# Patient Record
Sex: MALE | Race: WHITE | NOT HISPANIC OR LATINO | Employment: FULL TIME | URBAN - METROPOLITAN AREA
[De-identification: names, ages, dates, MRNs, and addresses within clinical notes are randomized per-mention and may not be internally consistent; named-entity substitution may affect disease eponyms.]

---

## 2017-11-07 ENCOUNTER — OFFICE VISIT - RIVER FALLS (OUTPATIENT)
Dept: FAMILY MEDICINE | Facility: CLINIC | Age: 22
End: 2017-11-07

## 2017-11-07 ASSESSMENT — MIFFLIN-ST. JEOR: SCORE: 2006.01

## 2018-10-18 ENCOUNTER — OFFICE VISIT - RIVER FALLS (OUTPATIENT)
Dept: FAMILY MEDICINE | Facility: CLINIC | Age: 23
End: 2018-10-18

## 2018-10-18 ASSESSMENT — MIFFLIN-ST. JEOR: SCORE: 2100.47

## 2018-10-19 LAB
CHOLEST SERPL-MCNC: 146 MG/DL
CHOLEST/HDLC SERPL: 2.7 {RATIO}
GLUCOSE BLD-MCNC: 88 MG/DL (ref 65–99)
HDLC SERPL-MCNC: 54 MG/DL
LDLC SERPL CALC-MCNC: 78 MG/DL
NONHDLC SERPL-MCNC: 92 MG/DL
TRIGL SERPL-MCNC: 67 MG/DL

## 2019-10-11 ENCOUNTER — OFFICE VISIT - RIVER FALLS (OUTPATIENT)
Dept: FAMILY MEDICINE | Facility: CLINIC | Age: 24
End: 2019-10-11

## 2019-10-11 ASSESSMENT — MIFFLIN-ST. JEOR: SCORE: 2138.12

## 2019-11-14 ENCOUNTER — OFFICE VISIT - RIVER FALLS (OUTPATIENT)
Dept: FAMILY MEDICINE | Facility: CLINIC | Age: 24
End: 2019-11-14

## 2019-11-14 ASSESSMENT — MIFFLIN-ST. JEOR: SCORE: 2158.08

## 2019-11-15 ENCOUNTER — COMMUNICATION - RIVER FALLS (OUTPATIENT)
Dept: FAMILY MEDICINE | Facility: CLINIC | Age: 24
End: 2019-11-15

## 2019-11-15 LAB
CHLAMYDIA TRACHOMATIS RNA, TMA - QUEST: NOT DETECTED
CHOLEST SERPL-MCNC: 158 MG/DL
CHOLEST/HDLC SERPL: 3.6 {RATIO}
HDLC SERPL-MCNC: 44 MG/DL
HIV AG/AB  - NOTE: NORMAL
LDLC SERPL CALC-MCNC: 100 MG/DL
NEISSERIA GONORRHOEAE RNA TMA: NOT DETECTED
NONHDLC SERPL-MCNC: 114 MG/DL
RPR: NORMAL
TRIGL SERPL-MCNC: 49 MG/DL

## 2020-05-28 ENCOUNTER — AMBULATORY - RIVER FALLS (OUTPATIENT)
Dept: FAMILY MEDICINE | Facility: CLINIC | Age: 25
End: 2020-05-28

## 2020-10-05 ENCOUNTER — OFFICE VISIT - RIVER FALLS (OUTPATIENT)
Dept: FAMILY MEDICINE | Facility: CLINIC | Age: 25
End: 2020-10-05

## 2020-10-05 ASSESSMENT — MIFFLIN-ST. JEOR: SCORE: 2209.57

## 2021-11-11 ENCOUNTER — OFFICE VISIT - RIVER FALLS (OUTPATIENT)
Dept: FAMILY MEDICINE | Facility: CLINIC | Age: 26
End: 2021-11-11

## 2021-11-11 LAB — GLUCOSE BLD-MCNC: 99 MG/DL (ref 65–99)

## 2021-11-11 ASSESSMENT — MIFFLIN-ST. JEOR: SCORE: 2320.24

## 2021-11-12 ENCOUNTER — COMMUNICATION - RIVER FALLS (OUTPATIENT)
Dept: FAMILY MEDICINE | Facility: CLINIC | Age: 26
End: 2021-11-12

## 2022-02-11 VITALS
HEIGHT: 74 IN | OXYGEN SATURATION: 95 % | SYSTOLIC BLOOD PRESSURE: 115 MMHG | HEART RATE: 69 BPM | TEMPERATURE: 97.1 F | BODY MASS INDEX: 30.38 KG/M2 | DIASTOLIC BLOOD PRESSURE: 69 MMHG | WEIGHT: 236.7 LBS

## 2022-02-11 VITALS
HEIGHT: 74 IN | BODY MASS INDEX: 32.01 KG/M2 | SYSTOLIC BLOOD PRESSURE: 120 MMHG | DIASTOLIC BLOOD PRESSURE: 70 MMHG | WEIGHT: 245 LBS | HEART RATE: 64 BPM | WEIGHT: 249.4 LBS | OXYGEN SATURATION: 96 % | BODY MASS INDEX: 31.44 KG/M2 | HEART RATE: 72 BPM | DIASTOLIC BLOOD PRESSURE: 72 MMHG | HEIGHT: 74 IN | SYSTOLIC BLOOD PRESSURE: 122 MMHG | TEMPERATURE: 98 F

## 2022-02-11 VITALS
HEART RATE: 63 BPM | HEIGHT: 74 IN | BODY MASS INDEX: 33.24 KG/M2 | TEMPERATURE: 97.5 F | DIASTOLIC BLOOD PRESSURE: 80 MMHG | WEIGHT: 259 LBS | SYSTOLIC BLOOD PRESSURE: 123 MMHG

## 2022-02-11 VITALS
WEIGHT: 283.4 LBS | HEIGHT: 74 IN | BODY MASS INDEX: 36.37 KG/M2 | SYSTOLIC BLOOD PRESSURE: 135 MMHG | DIASTOLIC BLOOD PRESSURE: 86 MMHG | HEART RATE: 79 BPM

## 2022-02-11 VITALS
TEMPERATURE: 98.1 F | BODY MASS INDEX: 27.59 KG/M2 | SYSTOLIC BLOOD PRESSURE: 112 MMHG | WEIGHT: 215 LBS | DIASTOLIC BLOOD PRESSURE: 68 MMHG | HEART RATE: 64 BPM | HEIGHT: 74 IN

## 2022-02-16 NOTE — TELEPHONE ENCOUNTER
---------------------  From: José Miguel Mendez PA-C   To: ERICK WILLS    Sent: 11/15/2019 8:44:07 AM CST  Subject: Patient Message - Results Notification          These are fine. I will contact you when the rest of your labs are back.  Results:  Date Result Name Ind Value Ref Range   11/14/2019 9:29 AM Cholesterol  158 mg/dL ( - <200)   11/14/2019 9:29 AM Non-HDL Cholesterol  114 ( - <130)   11/14/2019 9:29 AM HDL  44 mg/dL (>40 - )   11/14/2019 9:29 AM Cholesterol/HDL Ratio  3.6 ( - <5.0)   11/14/2019 9:29 AM LDL ((H)) 100    11/14/2019 9:29 AM Triglyceride  49 mg/dL ( - <150)

## 2022-02-16 NOTE — NURSING NOTE
Depression Screening Entered On:  10/11/2019 11:31 AM CDT    Performed On:  10/11/2019 11:30 AM CDT by Sarah Christian MA               Depression Screening   Little Interest - Pleasure in Activities :   Not at all   Feeling Down, Depressed, Hopeless :   Not at all   Initial Depression Screen Score :   0    Trouble Falling or Staying Asleep :   Not at all   Feeling Tired or Little Energy :   Not at all   Poor Appetite or Overeating :   Not at all   Feeling Bad About Yourself :   Not at all   Trouble Concentrating :   Not at all   Moving or Speaking Slowly :   Not at all   Thoughts Better Off Dead or Hurting Self :   Not at all   Detailed Depression Screen Score :   0    Total Depression Screen Score :   0    KEO Difficulty with Work, Home, Others :   Not difficult at all   Sarah Christian MA - 10/11/2019 11:30 AM CDT

## 2022-02-16 NOTE — PROGRESS NOTES
Patient:   ERICK WILLS            MRN: [id number]            FIN: [id number]               Age:   25 years     Sex:  Male     :  1995   Associated Diagnoses:   Annual physical exam   Author:   José Miguel Mendez PA-C   Diagnosis  Annual physical exam (JID57-CS Z00.00).     Visit Information      Date of Service: 10/05/2020 04:40 pm  Performing Location: [facility]  Encounter#: [id number]      Primary Care Provider (PCP):  José Miguel Mendez PA-C    NPI# [id number]      Referring Provider:  Casey Yousif MD    NPI# [id number]   Visit type:  Annual exam.    Accompanied by:  No one.    Source of history:  Medical record.    Referral source:  Self.    History limitation:  None.       Chief Complaint   10/5/2020 4:44 PM CDT    Physical and flu shot      Well Adult History   Well Adult History             The patient presents for well adult exam.  The patient's general health status is described as good.  The patient's diet is described as balanced.  Exercise: routine.  Additional pertinent history: no caffeine use, tobacco use none and alcohol use socially.        Review of Systems   Constitutional:  Negative.    Eye:  Negative.    Ear/Nose/Mouth/Throat:  Negative.    Respiratory:  Negative.    Cardiovascular:  Negative.    Gastrointestinal:  Negative.    Genitourinary:  Negative.    Hematology/Lymphatics:  Negative.    Endocrine:  Negative.    Immunologic:  Negative.    Musculoskeletal:  Negative.    Integumentary:  Negative except as documented in history of present illness.    Neurologic:  Negative.    Psychiatric:  Negative.    All other systems reviewed and negative      Health Status   Allergies:    Allergic Reactions (Selected)  No Known Medication Allergies   Medications:  (Selected)   Documented Medications  Documented  Benedryl Allergy Cold: PRN: Bee sting, 0 Refill(s)   Problem list:    All Problems (Selected)  Obesity / SNOMED CT 7358225660 / Probable      Histories   Past Medical  History:    Resolved  Obesity (278.00):  Resolved.   Family History:    Hypertension  Grandmother (M)  Grandmother (P)  Leukemia  Grandfather (P)  CA - Cancer of prostate  Grandfather (M)     Procedure history:    Appendectomy (748411375) on 6/9/2001 at 6 Years.   Social History:        Alcohol Assessment: Low Risk                     Comments:                      11/07/2017 - Maral Armenta MD                     drinks a beer once or twice a month at the most      Tobacco Assessment: Denies Tobacco Use            Never      Substance Abuse Assessment: Denies Substance Abuse      Employment and Education Assessment            Employed, Work/School description: .      Exercise and Physical Activity Assessment: Regular exercise            50 Exercise duration:.  5-6 times/week Exercise frequency:.  Good condition Self assessment:.  Weight lifting               Exercise type:.                     Comments:                      11/07/2017 - Maral Armenta MD                     both cardio and weight lifting      Sexual Assessment            Sexually active: No.        Physical Examination   Vital Signs   10/5/2020 4:44 PM CDT Temperature Tympanic 97.5 DegF  LOW    Peripheral Pulse Rate 63 bpm    HR Method Electronic    Systolic Blood Pressure 123 mmHg    Diastolic Blood Pressure 80 mmHg    Mean Arterial Pressure 94 mmHg    BP Site Right arm    BP Method Electronic      Measurements from flowsheet : Measurements   10/5/2020 4:44 PM CDT Height Measured - Standard 74 in    Weight Measured - Standard 259 lb    BSA 2.47 m2    Body Mass Index 33.25 kg/m2  HI      General:  No acute distress.    Eye:  Pupils are equal, round and reactive to light, Extraocular movements are intact, Normal conjunctiva.    HENT:  Normocephalic, Tympanic membranes are clear, Oral mucosa is moist, No pharyngeal erythema.    Neck:  Supple, Non-tender, No lymphadenopathy, No thyromegaly.    Respiratory:  Lungs are clear to  auscultation, Respirations are non-labored, Breath sounds are equal.    Cardiovascular:  Normal rate, Regular rhythm, No murmur.    Gastrointestinal:  Soft, Non-tender, Non-distended, Normal bowel sounds, No organomegaly.    Genitourinary:  No costovertebral angle tenderness.    Musculoskeletal:  Normal gait.    Integumentary:  No rash.    Neurologic:  No focal deficits.    Psychiatric:  Cooperative, Appropriate mood & affect.       Health Maintenance      Recommendations     Pending (in the next year)        OverDue           Tetanus Vaccine due  04/15/20  and every 10  year(s)           Influenza Vaccine due  08/31/20  and every 1  year(s)        Due            STD Counseling (if sexually active) (Male) due  10/05/20  and every 1  year(s)        Near Due            Alcohol Misuse Screen (Male) near due  10/11/20  and every 1  year(s)           HIV Screen (if sexually active) (Male) near due  11/14/20  and every 1  year(s)           Syphilis Screen (if sexually active) (Male) near due  11/14/20  and every 1  year(s)     Satisfied (in the past 1 year)        Satisfied            Alcohol Misuse Screen (Male) on  10/11/19.           Body Mass Index Check (Male) on  10/05/20.           Body Mass Index Check (Male) on  11/14/19.           Body Mass Index Check (Male) on  10/11/19.           Depression Screen (Male) on  10/05/20.           Depression Screen (Male) on  10/11/19.           Depression Screen (Male) on  10/11/19.           Depression Screen (Male) on  10/11/19.           HIV Screen (if sexually active) (Male) on  11/14/19.           High Blood Pressure Screen (Male) on  10/05/20.           High Blood Pressure Screen (Male) on  11/14/19.           High Blood Pressure Screen (Male) on  10/11/19.           Influenza Vaccine on  10/11/19.           Obesity Screen and Counseling (Male) on  10/05/20.           Obesity Screen and Counseling (Male) on  11/14/19.           Obesity Screen and Counseling (Male) on   10/11/19.           Syphilis Screen (if sexually active) (Male) on  11/14/19.           Tobacco Use Screen (Male) on  10/05/20.           Tobacco Use Screen (Male) on  11/14/19.           Tobacco Use Screen (Male) on  10/11/19.             Impression and Plan   Diagnosis     Annual physical exam (UZK13-KU Z00.00).     Plan:  RTC in one year and PRN.

## 2022-02-16 NOTE — LETTER
(Inserted Image. Unable to display)   October 19, 2021    ERICK WILLS  [address] [place] [address]            Dear ERICK,      Thank you for selecting [facility] for your healthcare needs.    Our records indicate you are due for the following services:     Fasting Lab Tests ~ Please do not eat or drink anything 10 hours prior to your scheduled appointment time.  (Water and any medications that you may need are allowed unless directed otherwise.)    If you had your labs done at another facility or with [facility] at On license of [facility], please bring in a copy of the results to your next visit, mail a copy, or drop off a copy of your results to your Healthcare Provider.    (FYI   Regarding office visits: In some instances, a video visit or telephone visit may be offered as an option.)      To schedule an appointment or if you have further questions, please contact your clinic at [phone].      Powered by [employer]    Sincerely,    José Miguel Mendez PA-C

## 2022-02-16 NOTE — NURSING NOTE
Comprehensive Intake Entered On:  10/11/2019 11:15 AM CDT    Performed On:  10/11/2019 11:11 AM CDT by Sarah Christian MA               Summary   Chief Complaint :   Annual Exam    Menstrual Status :   N/A   Weight Measured :   245 lb(Converted to: 245 lb 0 oz, 111.13 kg)    Height Measured :   73.5 in(Converted to: 6 ft 1 in, 186.69 cm)    Body Mass Index :   31.88 kg/m2 (HI)    Body Surface Area :   2.4 m2   Systolic Blood Pressure :   122 mmHg   Diastolic Blood Pressure :   70 mmHg   Mean Arterial Pressure :   87 mmHg   Peripheral Pulse Rate :   64 bpm   BP Site :   Right arm   Pulse Site :   Radial artery   BP Method :   Manual   HR Method :   Manual   Temperature Tympanic :   98.0 DegF(Converted to: 36.7 DegC)    Sarah Christian MA - 10/11/2019 11:11 AM CDT   Health Status   Allergies Verified? :   Yes   Medication History Verified? :   Yes   Immunizations Current :   Yes   Medical History Verified? :   Yes   Pre-Visit Planning Status :   Completed   Tobacco Use? :   Never smoker   Sarah Christian MA - 10/11/2019 11:11 AM CDT   Consents   Consent for Immunization Exchange :   Consent Granted   Consent for Immunizations to Providers :   Consent Granted   Sarah Christian MA - 10/11/2019 11:11 AM CDT   Meds / Allergies   (As Of: 10/11/2019 11:15:19 AM CDT)   Allergies (Active)   No Known Medication Allergies  Estimated Onset Date:   Unspecified ; Created By:   Sarah Christian MA; Reaction Status:   Active ; Category:   Drug ; Substance:   No Known Medication Allergies ; Type:   Allergy ; Updated By:   Sarah Christian MA; Reviewed Date:   10/11/2019 11:13 AM CDT        Medication List   (As Of: 10/11/2019 11:15:19 AM CDT)   Home Meds    APAP/diphenhydramine/pseudoephedrine  :   APAP/diphenhydramine/pseudoephedrine ; Status:   Documented ; Ordered As Mnemonic:   Benedryl Allergy Cold ; Simple Display Line:   PRN: Bee sting ; Catalog Code:   acetaminophen/diphenhydrAMINE/pseudoephe ; Order Dt/Tm:   8/13/2010 3:35:31  PM CDT

## 2022-02-16 NOTE — PROGRESS NOTES
Patient:   ERICK WILLS            MRN: [id number]            FIN: [id number]               Age:   24 years     Sex:  Male     :  1995   Associated Diagnoses:   Screening cholesterol level; Screening for STD (sexually transmitted disease); Obesity   Author:   José Miguel Mendez PA-C      Report Summary       1. Assessment and Plan:            Diagnosis: Screening cholesterol level (RCS12-OA Z13.220).       Chief Complaint   2019 8:55 AM CST   STD testing, fasting for bloodwork        Subjective   Chief complaint 2019 8:55 AM CST   STD testing, fasting for bloodwork  .     No symptoms.       Health Status   Allergies:    Allergic Reactions (All)  No Known Medication Allergies  Canceled/Inactive Reactions (All)  No known allergies   Medications:  (Selected)   Documented Medications  Documented  Benedryl Allergy Cold: PRN: Bee sting, 0 Refill(s),    Medications          *denotes recorded medication          *Benedryl Allergy Cold: PRN: Bee sting.       Problem list:    All Problems (Selected)  Obesity / SNOMED CT 8538105446 / Probable      Objective   Vital Signs   2019 8:55 AM CST Peripheral Pulse Rate 72 bpm    HR Method Electronic    Systolic Blood Pressure 120 mmHg    Diastolic Blood Pressure 72 mmHg    Mean Arterial Pressure 88 mmHg    BP Site Right arm    BP Method Manual    Oxygen Saturation 96 %      Measurements from flowsheet : Measurements   2019 8:55 AM CST Height Measured - Standard 73.5 in    Weight Measured - Standard 249.4 lb    BSA 2.42 m2    Body Mass Index 32.45 kg/m2  HI      General:  No acute distress.    Respiratory:  Respirations are non-labored.    Cardiovascular:  Normal rate.       Impression and Plan   Assessment and Plan:          Diagnosis: Screening cholesterol level (URE09-TB Z13.220), Screening for STD (sexually transmitted disease) (VXY61-YN Z11.3), Obesity (NTF07-XT E66.9).    Orders     Orders (Selected)   Outpatient Orders  Ordered  (Dispatched)  Chlamydia/Neisseria gonorrhoeae RNA, TMA* ([facility]): Specimen Type: Urine, Collection Date: 11/14/19 9:06:00 CST  HIV-1/2 Antigen and Antibodies, Fourth Generation, with Reflexes* ([facility]): Specimen Type: Serum, Collection Date: 11/14/19 9:06:00 CST  Lipid panel with reflex to direct ldl* ([facility]): Specimen Type: Serum, Collection Date: 11/14/19 9:08:00 CST  RPR (dx) w/refl titer and confirmatory testing* ([facility]): Specimen Type: Serum, Collection Date: 11/14/19 9:06:00 CST.     .    FU with labs

## 2022-02-16 NOTE — TELEPHONE ENCOUNTER
A summary of the submitted changes has been added to the patient's chart. You may review the document by clicking on the message attachment located above.Submission Details -Originating Source: PatientOriginating Author: ERICK Phippsbmission Date: 10/16/2018 at 06:20 PMAppointment Details - Appointment Date: 10/18/2018 at 06:00 AM Appointment Type: PHYSICAL - VHAppointment Resource: José Miguel Mendez PA-CAppointshira Location: [facility]

## 2022-02-16 NOTE — LETTER
(Inserted Image. Unable to display)   October 06, 2021    ERICK WILLS  [address] [place] [address]            Dear ERICK,      Thank you for selecting [facility] for your healthcare needs.    Our records indicate you are due for the following services:     Annual Physical.    (FYI   Regarding office visits: In some instances, a video visit or telephone visit may be offered as an option.)      To schedule an appointment or if you have further questions, please contact your clinic at [phone].      Powered by [employer]    Sincerely,    José Miguel Mendez PA-C

## 2022-02-16 NOTE — TELEPHONE ENCOUNTER
---------------------  From: José Miguel Mendez PA-C   To: ERICK WILLS    Sent: 11/12/2021 6:12:09 AM CST  Subject: General Message       This is high normal. We should repeat that in one year. Continue to stay active. Eat well balanced meals.    Results:  Date Result Name Value Ref Range   11/11/2021 9:11 AM Glucose Level 99 mg/dL (65 - 99)

## 2022-02-16 NOTE — NURSING NOTE
Comprehensive Intake Entered On:  10/5/2020 4:46 PM CDT    Performed On:  10/5/2020 4:44 PM CDT by Usha Montesinos CMA               Summary   Chief Complaint :   Physical and flu shot   Menstrual Status :   N/A   Weight Measured :   259 lb(Converted to: 259 lb 0 oz, 117.480 kg)    Height Measured :   74 in(Converted to: 6 ft 2 in, 187.96 cm)    Body Mass Index :   33.25 kg/m2 (HI)    Body Surface Area :   2.47 m2   Systolic Blood Pressure :   123 mmHg   Diastolic Blood Pressure :   80 mmHg   Mean Arterial Pressure :   94 mmHg   Peripheral Pulse Rate :   63 bpm   BP Site :   Right arm   BP Method :   Electronic   HR Method :   Electronic   Temperature Tympanic :   97.5 DegF(Converted to: 36.4 DegC)  (LOW)    Usha Montesinos CMA - 10/5/2020 4:44 PM CDT   Health Status   Allergies Verified? :   Yes   Medication History Verified? :   Yes   Immunizations Current :   Yes   Medical History Verified? :   Yes   Pre-Visit Planning Status :   Completed   Tobacco Use? :   Never smoker   Usha Montesinos CMA - 10/5/2020 4:44 PM CDT   Consents   Consent for Immunization Exchange :   Consent Granted   Consent for Immunizations to Providers :   Consent Granted   Usha Montesinos CMA - 10/5/2020 4:44 PM CDT   Meds / Allergies   (As Of: 10/5/2020 4:46:59 PM CDT)   Allergies (Active)   No Known Medication Allergies  Estimated Onset Date:   Unspecified ; Created By:   Sarah Christian MA; Reaction Status:   Active ; Category:   Drug ; Substance:   No Known Medication Allergies ; Type:   Allergy ; Updated By:   Sarah Christian MA; Reviewed Date:   10/5/2020 4:45 PM CDT        Medication List   (As Of: 10/5/2020 4:46:59 PM CDT)   Home Meds    APAP/diphenhydramine/pseudoephedrine  :   APAP/diphenhydramine/pseudoephedrine ; Status:   Documented ; Ordered As Mnemonic:   Benedryl Allergy Cold ; Simple Display Line:   PRN: Bee sting ; Catalog Code:   acetaminophen/diphenhydrAMINE/pseudoephe ; Order Dt/Tm:   8/13/2010 3:35:31 PM CDT            ID Risk  Screen   Recent Travel History :   No recent travel   Family Member Travel History :   No recent travel   Other Exposure to Infectious Disease :   Unknown   Usha Montesinos CMA - 10/5/2020 4:44 PM CDT

## 2022-02-16 NOTE — PROGRESS NOTES
Patient:   ERICK WILLS            MRN: [id number]            FIN: [id number]               Age:   23 years     Sex:  Male     :  1995   Associated Diagnoses:   Annual physical exam; Lipid screening; Encounter for screening examination for impaired glucose regulation and diabetes mellitus   Author:   José Miguel Mendez PA-C      Visit Information      Date of Service: 10/18/2018 08:00 am  Performing Location: [facility]  Encounter#: [id number]      Primary Care Provider (PCP):  José Miguel Mendez PA-C    NPI# [id number]      Referring Provider:  José Miguel Mendez PA-C# [id number]   Visit type:  Annual exam.    Accompanied by:  No one.    Source of history:  Medical record.    Referral source:  Self.    History limitation:  None.       Chief Complaint   10/18/2018 8:14 AM CDT   Patient is here for annual px and has no concerns, Flu shot  (Modified)       Well Adult History   Well Adult History             The patient presents for well adult exam.  The patient's general health status is described as good.  The patient's diet is described as balanced.  Exercise: routine.  Complaint: None.  Additional pertinent history: no caffeine use, tobacco use none and no alcohol use.        Review of Systems   Constitutional:  Negative.    Eye:  Negative.    Ear/Nose/Mouth/Throat:  Negative.    Respiratory:  Negative.    Cardiovascular:  Negative.    Gastrointestinal:  Negative.    Genitourinary:  Negative.    Hematology/Lymphatics:  Negative.    Endocrine:  Negative.    Immunologic:  Negative.    Musculoskeletal:  Negative.    Integumentary:  Negative except as documented in history of present illness.    Neurologic:  Negative.    Psychiatric:  Negative.    All other systems reviewed and negative      Health Status   Allergies:    Allergic Reactions (All)  No known allergies   Medications:  (Selected)   Documented Medications  Documented  Benedryl Allergy Cold: PRN: Bee sting, 0 Refill(s)   Problem list:    All  Problems  Obesity / SNOMED CT 0739828239 / Probable  Resolved: Obesity / ICD-9-.00  Canceled: No Chronic Problems / Cerner NKP      Histories   Past Medical History:    Resolved  Obesity (278.00):  Resolved.   Family History:    Hypertension  Grandmother (M)  Grandmother (P)  Leukemia  Grandfather (P)  CA - Cancer of prostate  Grandfather (M)     Procedure history:    Appendectomy (255137673) on 6/9/2001 at 6 Years.   Social History:        Alcohol Assessment: Low Risk                     Comments:                      11/07/2017 - Maral Armenta MD                     drinks a beer once or twice a month at the most      Tobacco Assessment: Denies Tobacco Use            Never      Substance Abuse Assessment: Denies Substance Abuse      Employment and Education Assessment            Employed, Work/School description: .      Exercise and Physical Activity Assessment: Regular exercise                     Comments:                      11/07/2017 - Maral Armenta MD                     both cardio and weight lifting      Sexual Assessment            Sexually active: No.        Physical Examination   Vital Signs   10/18/2018 8:14 AM CDT Temperature Tympanic 97.1 DegF  LOW    Peripheral Pulse Rate 69 bpm    Pulse Site Radial artery    HR Method Electronic    Systolic Blood Pressure 115 mmHg    Diastolic Blood Pressure 69 mmHg    Mean Arterial Pressure 84 mmHg    BP Site Left arm    BP Method Electronic    Oxygen Saturation 95 %      Measurements from flowsheet : Measurements   10/18/2018 8:14 AM CDT Height Measured - Standard 73.5 in    Weight Measured - Standard 236.7 lb    BSA 2.36 m2    Body Mass Index 30.8 kg/m2  HI      General:  No acute distress.    Eye:  Pupils are equal, round and reactive to light, Extraocular movements are intact, Normal conjunctiva.    HENT:  Normocephalic, Tympanic membranes are clear, Oral mucosa is moist, No pharyngeal erythema.    Neck:  Supple, Non-tender, No  lymphadenopathy, No thyromegaly.    Respiratory:  Lungs are clear to auscultation, Respirations are non-labored, Breath sounds are equal.    Cardiovascular:  Normal rate, Regular rhythm, No murmur.    Gastrointestinal:  Soft, Non-tender, Non-distended, Normal bowel sounds, No organomegaly.    Genitourinary:  No costovertebral angle tenderness.    Musculoskeletal:  Pain at left TMJ region with some clicking. .    Integumentary:  No rash, 2mm skin tag left upper eyelid. 4mm Pigmented nevi on right cheek, near corner of mouth..    Neurologic:  No focal deficits.    Psychiatric:  Cooperative, Appropriate mood & affect.       Health Maintenance      Recommendations     Pending (in the next year)        Due            HIV Screen (if sexually active) (Male) due  10/18/18  and every 1  year(s)           STD Counseling (if sexually active) (Male) due  10/18/18  and every 1  year(s)           Syphilis Screen (if sexually active) (Male) due  10/18/18  and every 1  year(s)        Near Due            Body Mass Index Check (Male) near due  11/07/18  and every 1  year(s)           High Blood Pressure Screen (Male) near due  11/07/18  and every 1  year(s)           Tobacco Use Screen (Male) near due  11/07/18  and every 1  year(s)           Alcohol Misuse Screen (Male) near due  11/07/18  and every 1  year(s)           Depression Screen (Male) near due  11/07/18  and every 1  year(s)     Satisfied (in the past 1 year)        Satisfied            Alcohol Misuse Screen (Male) on  11/07/17.           Body Mass Index Check (Male) on  11/07/17.           Depression Screen (Male) on  11/07/17.           Depression Screen (Male) on  11/07/17.           Depression Screen (Male) on  11/07/17.           High Blood Pressure Screen (Male) on  11/07/17.           Tobacco Use Screen (Male) on  11/07/17.          Procedure   Dermatology Surgical Procedure   Date/ Time:  11/25/2015 2:18:00 PM.     Confirmed: patient, procedure, side, and site are  correct.     Informed consent: signed by patient.     Indication: remove lesion.     Procedure performed: shave biopsy.     Shave biopsy: site: right cheek, 15  blade, sterile preparation of site with 70 % alcohol, Anesthesia (1% xylocaine, without epinephrine), lesion size and depth diameter  4 mm, 1  lesions biopsied, specimen obtained and placed in preservative, specimen obtained and sent to pathology, no repair necessary, Skin tag sniped at base. Not sent in. No repair needed..     Complications: none.     Procedure tolerated: well.        Impression and Plan   Diagnosis     Annual physical exam (LAO76-BE Z00.00).     Lipid screening (OGM44-BQ Z13.220).     Encounter for screening examination for impaired glucose regulation and diabetes mellitus (NCX72-VM Z13.1).     Patient Instructions:       Counseled: Patient.    RTC in one year.

## 2022-02-16 NOTE — PROGRESS NOTES
Patient:   ERICK WILLS            MRN: [id number]            FIN: [id number]               Age:   22 years     Sex:  Male     :  1995   Associated Diagnoses:   Well adult exam   Author:   Maral Armenta MD      Chief Complaint   2017 6:20 PM CST    Physical        Well Adult History   Well Adult History             The patient presents for well adult exam.  The patient's general health status is described as good.  The patient's diet is described as balanced and has been working at eating healthy, losing weight.  Exercise: routine.  Associated symptoms consist of none.  Additional pertinent history: Getting  next year. Works as  in [place]. Lives in [place]..        Health Status   Allergies:    Allergic Reactions (All)  No known allergies   Medications:  (Selected)   Outpatient Medications  Ordered  influenza virus vaccine, inactivated preservative-free quadrivalent intramuscular suspension: 0.5 mL, im, once  Documented Medications  Documented  Benedryl Allergy Cold: PRN: Bee sting, 0 Refill(s)   Problem list:    All Problems  No Chronic Problems / Cerner NKP  Resolved: Obesity / ICD-9-.00      Histories   Past Medical History:    Resolved  Obesity (ICD-9-.00):  Resolved.   Family History:    Grandmother (M)  Hypertension  Grandmother (P)  Hypertension  Grandfather (M)  CA - Cancer of prostate  Grandfather (P)  Leukemia     Procedure history:    Appendectomy ([id number]) on 2001 at 6 Years.   Social History:        Alcohol Assessment: Low Risk                     Comments:                      2017 - Maral Armenta MD                     drinks a beer once or twice a month at the most      Tobacco Assessment: Denies Tobacco Use            Never      Substance Abuse Assessment: Denies Substance Abuse      Exercise and Physical Activity Assessment: Regular exercise                     Comments:                      2017 - Maral Armenta MD                      both cardio and weight lifting        Physical Examination   Vital Signs   11/7/2017 6:20 PM CST Temperature Tympanic 98.1 DegF    Peripheral Pulse Rate 64 bpm    Pulse Site Radial artery    HR Method Manual    Systolic Blood Pressure 112 mmHg    Diastolic Blood Pressure 68 mmHg    Mean Arterial Pressure 83 mmHg    BP Site Right arm    BP Method Manual      Measurements from flowsheet : Measurements   11/7/2017 6:20 PM CST Height Measured - Standard 73.75 in    Weight Measured - Standard 215 lb    BSA 2.25 m2    Body Mass Index 27.79 kg/m2         Health Maintenance      Recommendations     Pending (in the next year)        OverDue           Depression Screen (Male) due  11/25/16  and every 1  year(s)        Due            Alcohol Misuse Screen (Male) due  11/07/17  and every 1  year(s)           HIV Screen (if sexually active) (Male) due  11/07/17  and every 1  year(s)           STD Counseling (if sexually active) (Male) due  11/07/17  and every 1  year(s)           Syphilis Screen (if sexually active) (Male) due  11/07/17  and every 1  year(s)     Satisfied (in the past 1 year)        Satisfied            Body Mass Index Check (Male) on  11/07/17.           High Blood Pressure Screen (Male) on  11/07/17.           Obesity Screen and Counseling (Male) on  11/07/17.           Tobacco Use Screen (Male) on  11/07/17.        Impression and Plan   Diagnosis     Well adult exam (TLA58-ID Z00.00).     Course:  Progressing as expected.    Patient Instructions:       Counseled: Patient, BMI, diet, exercise, preventive services..

## 2022-02-16 NOTE — NURSING NOTE
Comprehensive Intake Entered On:  11/11/2021 9:03 AM CST    Performed On:  11/11/2021 9:00 AM CST by Usha Montesinos CMA               Summary   Chief Complaint :   Physical   Menstrual Status :   N/A   Weight Measured :   283.4 lb(Converted to: 283 lb 6 oz, 128.548 kg)    Height Measured :   74 in(Converted to: 6 ft 2 in, 187.96 cm)    Body Mass Index :   36.38 kg/m2 (HI)    Body Surface Area :   2.59 m2   Systolic Blood Pressure :   135 mmHg (HI)    Diastolic Blood Pressure :   86 mmHg (HI)    Mean Arterial Pressure :   102 mmHg   Peripheral Pulse Rate :   79 bpm   BP Site :   Right arm   BP Method :   Electronic   HR Method :   Electronic   Usha Montesinos CMA - 11/11/2021 9:00 AM CST   Health Status   Allergies Verified? :   Yes   Medication History Verified? :   Yes   Immunizations Current :   Yes   Medical History Verified? :   Yes   Usha Montesinos CMA - 11/11/2021 9:00 AM CST   Consents   Consent for Immunization Exchange :   Consent Granted   Consent for Immunizations to Providers :   Consent Granted   Usha Montesinos CMA - 11/11/2021 9:00 AM CST   Meds / Allergies   (As Of: 11/11/2021 9:03:42 AM CST)   Allergies (Active)   No Known Medication Allergies  Estimated Onset Date:   Unspecified ; Created By:   Sarah Christian MA; Reaction Status:   Active ; Category:   Drug ; Substance:   No Known Medication Allergies ; Type:   Allergy ; Updated By:   Sarah Christian MA; Reviewed Date:   11/11/2021 9:02 AM CST        Medication List   (As Of: 11/11/2021 9:03:42 AM CST)   Home Meds    APAP/diphenhydramine/pseudoephedrine  :   APAP/diphenhydramine/pseudoephedrine ; Status:   Documented ; Ordered As Mnemonic:   Benedryl Allergy Cold ; Simple Display Line:   PRN: Bee sting ; Catalog Code:   acetaminophen/diphenhydrAMINE/pseudoephe ; Order Dt/Tm:   8/13/2010 3:35:31 PM CDT            Social History   Social History   (As Of: 11/11/2021 9:03:42 AM CST)   Alcohol:  Low Risk       Comments:  11/7/2017 6:34 PM - Kathi EDDY,  Maral: drinks a beer once or twice a month at the most   (Last Updated: 11/7/2017 6:34:53 PM CST by Maral Armenta MD)          Tobacco:  Denies Tobacco Use      Never   (Last Updated: 8/29/2013 11:14:58 AM CDT by Tiffanie Desir CMA)   Never (less than 100 in lifetime)   (Last Updated: 11/11/2021 9:02:15 AM CST by Usha Montesinos CMA)          Electronic Cigarette/Vaping:        Electronic Cigarette Use: Never.   (Last Updated: 11/11/2021 9:02:19 AM CST by Usha Montesinos CMA)          Substance Abuse:  Denies Substance Abuse      (Last Updated: 11/7/2017 6:34:56 PM CST by Maral Armenta MD )         Employment/School:        Employed, Work/School description: .   (Last Updated: 11/7/2017 6:50:24 PM CST by Usha Montesinos CMA)          Exercise:  Regular exercise      50 Exercise duration:.  5-6 times/week Exercise frequency:.  Good condition Self assessment:.  Weight lifting Exercise type:.   Comments:  11/7/2017 6:35 PM - Maral Armenta MD: both cardio and weight lifting   (Last Updated: 1/3/2019 7:26:39 PM UTC by Makayla Wood CMA)          Sexual:        Sexually active: No.   (Last Updated: 11/7/2017 6:50:12 PM CST by Usha Montesinos CMA)

## 2022-02-16 NOTE — LETTER
(Inserted Image. Unable to display)   January 14, 2020      ERICK WILLS  [address], [place] [id number]        Dear ERICK,      Thank you for selecting [facility] ([facility]viously [facility]) for your healthcare needs.     Our records indicate you are due for the following services:     Immunization update    To schedule an appointment or if you have further questions, please contact your primary clinic:   [facility]          [phone]   [facility]    [phone]             [facility]         [phone]      Powered by [employer]    Sincerely,    José Miguel Mendez PA-C

## 2022-02-16 NOTE — NURSING NOTE
Comprehensive Intake Entered On:  11/14/2019 8:59 AM CST    Performed On:  11/14/2019 8:55 AM CST by Usha Montesinos CMA               Summary   Chief Complaint :   STD testing, fasting for bloodwork   Menstrual Status :   N/A   Weight Measured :   249.4 lb(Converted to: 249 lb 6 oz, 113.13 kg)    Height Measured :   73.5 in(Converted to: 6 ft 1 in, 186.69 cm)    Body Mass Index :   32.45 kg/m2 (HI)    Body Surface Area :   2.42 m2   Systolic Blood Pressure :   120 mmHg   Diastolic Blood Pressure :   72 mmHg   Mean Arterial Pressure :   88 mmHg   Peripheral Pulse Rate :   72 bpm   BP Site :   Right arm   BP Method :   Manual   HR Method :   Electronic   Oxygen Saturation :   96 %   Usha Montesinos CMA - 11/14/2019 8:55 AM CST   Health Status   Allergies Verified? :   Yes   Medication History Verified? :   Yes   Immunizations Current :   Yes   Medical History Verified? :   Yes   Tobacco Use? :   Never smoker   Usha Montesinos CMA - 11/14/2019 8:55 AM CST   Consents   Consent for Immunization Exchange :   Consent Granted   Consent for Immunizations to Providers :   Consent Granted   Usha Montesinos CMA - 11/14/2019 8:55 AM CST   Meds / Allergies   (As Of: 11/14/2019 8:59:19 AM CST)   Allergies (Active)   No Known Medication Allergies  Estimated Onset Date:   Unspecified ; Created By:   Saarh Christian MA; Reaction Status:   Active ; Category:   Drug ; Substance:   No Known Medication Allergies ; Type:   Allergy ; Updated By:   Sarah Christian MA; Reviewed Date:   11/14/2019 8:55 AM CST        Medication List   (As Of: 11/14/2019 8:59:19 AM CST)   Home Meds    APAP/diphenhydramine/pseudoephedrine  :   APAP/diphenhydramine/pseudoephedrine ; Status:   Documented ; Ordered As Mnemonic:   Benedryl Allergy Cold ; Simple Display Line:   PRN: Bee sting ; Catalog Code:   acetaminophen/diphenhydrAMINE/pseudoephe ; Order Dt/Tm:   8/13/2010 3:35:31 PM CDT

## 2022-02-16 NOTE — PROGRESS NOTES
Patient:   ERICK WILLS            MRN: [id number]            FIN: [id number]               Age:   24 years     Sex:  Male     :  1995   Associated Diagnoses:   Well adult exam   Author:   Vincent Trinidad MD      Visit Information      Date of Service: 10/11/2019 11:00 am  Performing Location: [facility]  Encounter#: [id number]      Primary Care Provider (PCP):  José Miguel Mendez PA-C    NPI# [id number]      Referring Provider:  Vincent Trinidad MD    NPI# [id number]      Chief Complaint   10/11/2019 11:11 AM CDT  Annual Exam     Chief complaint and symptoms noted above confirmed with patient.      Well Adult History   Well Adult History             The patient presents for well adult exam.  The patient's general health status is described as good.  The patient's diet is described as balanced.  Exercise: routine, aerobic activity.  Associated symptoms consist of weight loss and desired.  Additional pertinent history: none, no caffeine use, tobacco use none and alcohol use socially.        Review of Systems   Constitutional:  Negative.    Eye:  Negative.    Ear/Nose/Mouth/Throat:  Negative.    Respiratory:  Negative.    Cardiovascular:  Negative.    Gastrointestinal:  Negative.    Genitourinary:  Negative.    Hematology/Lymphatics:  Negative.    Endocrine:  Negative.    Immunologic:  Negative.    Musculoskeletal:  Negative.    Integumentary:  Negative.    Neurologic:  Negative.    Psychiatric:  Negative.       Health Status   Allergies:    Allergic Reactions (Selected)  No Known Medication Allergies   Medications:  (Selected)   Documented Medications  Documented  Benedryl Allergy Cold: PRN: Bee sting, 0 Refill(s)   Problem list:    All Problems  Obesity / SNOMED CT 0730453219 / Probable  Resolved: Obesity / ICD-9-.00  Canceled: No Chronic Problems / Cerner NKP      Histories   Past Medical History:    Resolved  Obesity (ICD-9-.00):  Resolved.   Family History:     Hypertension  Grandmother (M)  Grandmother (P)  Leukemia  Grandfather (P)  CA - Cancer of prostate  Grandfather (M)     Procedure history:    Appendectomy (SNOMED CT 862235398) on 6/9/2001 at 6 Years.   Social History:        Alcohol Assessment: Low Risk                     Comments:                      11/07/2017 - Maral Armenta MD                     drinks a beer once or twice a month at the most      Tobacco Assessment: Denies Tobacco Use            Never      Substance Abuse Assessment: Denies Substance Abuse      Employment and Education Assessment            Employed, Work/School description: .      Exercise and Physical Activity Assessment: Regular exercise            50 Exercise duration:.  5-6 times/week Exercise frequency:.  Good condition Self assessment:.  Weight lifting               Exercise type:.                     Comments:                      11/07/2017 - Maral Armenta MD                     both cardio and weight lifting      Sexual Assessment            Sexually active: No.        Physical Examination   Vital Signs   10/11/2019 11:11 AM CDT Temperature Tympanic 98.0 DegF    Peripheral Pulse Rate 64 bpm    Pulse Site Radial artery    HR Method Manual    Systolic Blood Pressure 122 mmHg    Diastolic Blood Pressure 70 mmHg    Mean Arterial Pressure 87 mmHg    BP Site Right arm    BP Method Manual      Measurements from flowsheet : Measurements   10/11/2019 11:11 AM CDT Height Measured - Standard 73.5 in    Weight Measured - Standard 245 lb    BSA 2.4 m2    Body Mass Index 31.88 kg/m2  HI      General:  Alert and oriented, No acute distress.    Eye:  Normal conjunctiva.    HENT:  Normocephalic, Tympanic membranes are clear.    Neck:  Supple, Non-tender.    Respiratory:  Lungs are clear to auscultation, Respirations are non-labored.    Cardiovascular:  Normal rate, Regular rhythm, No murmur.    Gastrointestinal:  Soft, Non-tender, Non-distended.    Genitourinary:  No  costovertebral angle tenderness, No scrotal tenderness, No inguinal tenderness.    Musculoskeletal:  Normal range of motion, Normal strength, No tenderness, No swelling.    Integumentary:  Warm, Dry, Pink.    Neurologic:  Alert, Oriented, Normal sensory.    Psychiatric:  Cooperative, Appropriate mood & affect, Normal judgment, Non-suicidal.       Impression and Plan   Diagnosis     Well adult exam (TLA58-TF Z00.00).     Course:  Progressing as expected.    Patient Instructions:       Counseled: Patient, Verbalized understanding.

## 2022-02-16 NOTE — PROGRESS NOTES
Patient:   ERICK WILLS            MRN: [id number]            FIN: [id number]               Age:   26 years     Sex:  Male     :  1995   Associated Diagnoses:   Obesity; Annual physical exam   Author:   José Miguel Mendez PA-C      Report Summary   Diagnosis  Annual physical exam (CZW54-WR Z00.00).  Orders   Visit Information      Date of Service: 2021 08:57 am  Performing Location: [facility]  Encounter#: [id number]      Primary Care Provider (PCP):  José Miguel Mendez PA-C, NPI# [id number]      Referring Provider:  José Miguel Mendez PA-C# [id number]   Visit type:  Annual exam.    Accompanied by:  No one.    Source of history:  Medical record.    Referral source:  Self.    History limitation:  None.       Chief Complaint   2021 9:00 AM CST   Physical        Well Adult History   Well Adult History             The patient presents for well adult exam.  The patient's general health status is described as good.  The patient's diet is described as balanced.  Exercise: routine, aerobic activity, anaerobic activity, 2.5  times per week.  Associated symptoms consist of weight gain.  Additional pertinent history: no caffeine use, tobacco use none and alcohol use socially.        Review of Systems   Constitutional:  Negative.    Eye:  Negative.    Ear/Nose/Mouth/Throat:  Negative.    Respiratory:  Negative.    Cardiovascular:  Negative.    Gastrointestinal:  Negative.    Genitourinary:  Negative.    Hematology/Lymphatics:  Negative.    Endocrine:  Negative.    Immunologic:  Negative.    Musculoskeletal:  Negative.    Integumentary:  Negative except as documented in history of present illness.    Neurologic:  Negative.    Psychiatric:  Negative.    All other systems reviewed and negative      Health Status   Allergies:    Allergic Reactions (Selected)  No Known Medication Allergies   Medications:  (Selected)   Documented Medications  Documented  Benedryl Allergy Cold: PRN: Bee sting, 0  Refill(s)   Problem list:    All Problems (Selected)  Obesity / SNOMED CT 6751496221 / Probable      Histories   Past Medical History:    Resolved  Obesity (278.00):  Resolved.   Family History:    Hypertension  Grandmother (M)  Grandmother (P)  Leukemia  Grandfather (P)  CA - Cancer of prostate  Grandfather (M)     Procedure history:    Appendectomy ([id number]) on 6/9/2001 at 6 Years.   Social History:        Electronic Cigarette/Vaping Assessment            Electronic Cigarette Use: Never.      Alcohol Assessment: Low Risk                     Comments:                      11/07/2017 - Maral Armenta MD                     drinks a beer once or twice a month at the most      Tobacco Assessment: Denies Tobacco Use            Never            Never (less than 100 in lifetime)      Substance Abuse Assessment: Denies Substance Abuse      Employment and Education Assessment            Employed, Work/School description: .      Exercise and Physical Activity Assessment: Regular exercise            50 Exercise duration:.  5-6 times/week Exercise frequency:.  Good condition Self assessment:.  Weight lifting               Exercise type:.                     Comments:                      11/07/2017 - Maral Armenta MD                     both cardio and weight lifting      Sexual Assessment            Sexually active: No.        Physical Examination   Vital Signs   11/11/2021 9:00 AM CST Peripheral Pulse Rate 79 bpm    HR Method Electronic    Systolic Blood Pressure 135 mmHg  HI    Diastolic Blood Pressure 86 mmHg  HI    Mean Arterial Pressure 102 mmHg    BP Site Right arm    BP Method Electronic      Measurements from flowsheet : Measurements   11/11/2021 9:00 AM CST Height Measured - Standard 74 in    Weight Measured - Standard 283.4 lb    BSA 2.59 m2    Body Mass Index 36.38 kg/m2  HI      General:  No acute distress.    Eye:  Pupils are equal, round and reactive to light, Extraocular movements are intact,  Normal conjunctiva.    HENT:  Normocephalic, Tympanic membranes are clear, Oral mucosa is moist, No pharyngeal erythema.    Neck:  Supple, Non-tender, No lymphadenopathy, No thyromegaly.    Respiratory:  Lungs are clear to auscultation, Respirations are non-labored, Breath sounds are equal.    Cardiovascular:  Normal rate, Regular rhythm, No murmur.    Gastrointestinal:  Soft, Non-tender, Non-distended, Normal bowel sounds, No organomegaly.    Genitourinary:  No costovertebral angle tenderness.    Musculoskeletal:  Normal gait.    Integumentary:  No rash.    Neurologic:  No focal deficits.    Psychiatric:  Cooperative, Appropriate mood & affect.       Health Maintenance      Recommendations     Pending (in the next year)        OverDue           HIV Screen (if sexually active) due  11/14/20  and every 1  year(s)           Syphilis Screen (if sexually active) due  11/14/20  and every 1  year(s)           Influenza Vaccine due  09/01/21  and every 1  year(s)           Tobacco Use Screen due  10/05/21  and every 1  year(s)        Due            STD Counseling (if sexually active) due  11/11/21  and every 1  year(s)        Due In Future            Alcohol Misuse Screen not due until  11/08/22  and every 1  year(s)           Depression Screen not due until  11/08/22  and every 1  year(s)     Satisfied (in the past 1 year)        Satisfied            Alcohol Misuse Screen on  11/08/21.           Body Mass Index Check on  11/11/21.           Depression Screen on  11/08/21.           High Blood Pressure Screen on  11/11/21.           Obesity Screen and Counseling on  11/11/21.          Impression and Plan   Diagnosis     Obesity (SBK45-DE E66.9).     Annual physical exam (SEY82-LH Z00.00).     Orders     Orders (Selected)   Outpatient Orders  Ordered (Dispatched)  Glucose* ([facility]): Specimen Type: Serum, Collection Date: 11/11/21 9:08:00 CST.     Plan:  RTC in one year and PRN. Lipids were fine last year. Will not repeat at  this time..

## 2022-02-16 NOTE — NURSING NOTE
CAGE Assessment Entered On:  10/11/2019 11:31 AM CDT    Performed On:  10/11/2019 11:30 AM CDT by Sarah Christian MA               Assessment   Have you ever felt you should cut down on your drinking :   No   Have people annoyed you by criticizing your drinking :   No   Have you ever felt bad or guilty about your drinking :   No   Have you ever taken a drink first thing in the morning to steady your nerves or get rid of a hangover (Eye-opener) :   No   CAGE Score :   0    Sarah Christian MA - 10/11/2019 11:30 AM CDT

## 2022-04-03 ENCOUNTER — HEALTH MAINTENANCE LETTER (OUTPATIENT)
Age: 27
End: 2022-04-03

## 2022-06-22 ENCOUNTER — OFFICE VISIT (OUTPATIENT)
Dept: FAMILY MEDICINE | Facility: CLINIC | Age: 27
End: 2022-06-22
Payer: COMMERCIAL

## 2022-06-22 VITALS
TEMPERATURE: 97.5 F | WEIGHT: 272 LBS | HEART RATE: 72 BPM | BODY MASS INDEX: 34.91 KG/M2 | DIASTOLIC BLOOD PRESSURE: 84 MMHG | OXYGEN SATURATION: 99 % | RESPIRATION RATE: 16 BRPM | HEIGHT: 74 IN | SYSTOLIC BLOOD PRESSURE: 124 MMHG

## 2022-06-22 DIAGNOSIS — T63.461A YELLOW JACKET STING, ACCIDENTAL OR UNINTENTIONAL, INITIAL ENCOUNTER: Primary | ICD-10-CM

## 2022-06-22 PROBLEM — E66.9 OBESITY: Status: ACTIVE | Noted: 2022-06-22

## 2022-06-22 PROCEDURE — 99213 OFFICE O/P EST LOW 20 MIN: CPT | Performed by: PHYSICIAN ASSISTANT

## 2022-06-22 RX ORDER — PREDNISONE 20 MG/1
40 TABLET ORAL DAILY
Qty: 10 TABLET | Refills: 0 | Status: SHIPPED | OUTPATIENT
Start: 2022-06-22 | End: 2022-06-27

## 2022-06-22 NOTE — PROGRESS NOTES
Assessment & Plan     Yellow jacket sting, accidental or unintentional, initial encounter  Patient is experiencing a local reaction to a wasp or bee sting.  Recommend ice elevation antihistamine such as Zyrtec or Allegra, use of Benadryl drill supplemented for ongoing itching at nighttime but caution regarding sedation.  He was given a a 5-day course of prednisone.  He will follow-up for persistent or worsening symptoms.  PI given and discussed.  - predniSONE (DELTASONE) 20 MG tablet  Dispense: 10 tablet; Refill: 0       Return if symptoms worsen or fail to improve.    G[facility]    Subjective     Ketan is a 27 year old male who presents to clinic today for the following health issues:  Chief Complaint   Patient presents with     Insect Bites     Yellow jacket sting on right wrist x 3 days.  Area has become more swollen and red this morning.     History of Present Illness       Reason for visit:  Bee sting  Symptom onset:  1-3 days ago  Symptom intensity:  Moderate  Symptom progression:  Worsening  Had these symptoms before:  No    He eats 0-1 servings of fruits and vegetables daily.He consumes 1 sweetened beverage(s) daily.He exercises with enough effort to increase his heart rate 20 to 29 minutes per day.  He exercises with enough effort to increase his heart rate 3 or less days per week.   He is taking medications regularly.      Ketan presents to the clinic accompanied by his wife with concerns regarding a yellowjacket sting.  He was taking it down an umbrella when he was stung by a bee or yellow jacket wasp.  This occurred 2 days ago.  He iced that evening but has noted progressive redness and swelling.  He denies any fevers.  He has tried ice.  He denies any shortness of breath or difficulty breathing.  He has not had bee stings in the past.      Review of Systems  Constitutional, HEENT, cardiovascular, pulmonary, gi and gu systems are negative, except as  "otherwise noted.      Objective    /84 (BP Location: Right arm, Patient Position: Sitting, Cuff Size: Adult Large)   Pulse 72   Temp 97.5  F (36.4  C) (Tympanic)   Resp 16   Ht 1.867 m (6' 1.5\")   Wt 123.4 kg (272 lb)   SpO2 99%   BMI 35.40 kg/m    Physical Exam   Patient is in no acute distress and appears well.  Examination of the right hand reveals erythema and moderate swelling from the PIP joints of the first through fifth digits extending to the distal forearm.  Sensation and peripheral pulses are intact.  The area is warm to the touch.  There is a central puncta.          "

## 2022-10-03 ENCOUNTER — HEALTH MAINTENANCE LETTER (OUTPATIENT)
Age: 27
End: 2022-10-03

## 2022-11-16 ASSESSMENT — ENCOUNTER SYMPTOMS
DIZZINESS: 0
FEVER: 0
WEAKNESS: 0
FREQUENCY: 0
JOINT SWELLING: 0
HEMATOCHEZIA: 0
HEADACHES: 0
ABDOMINAL PAIN: 0
CONSTIPATION: 0
CHILLS: 0
SORE THROAT: 0
EYE PAIN: 0
HEMATURIA: 0
DYSURIA: 0
NERVOUS/ANXIOUS: 0
NAUSEA: 0
HEARTBURN: 0
ARTHRALGIAS: 0
DIARRHEA: 0
SHORTNESS OF BREATH: 0
PALPITATIONS: 0
COUGH: 0
MYALGIAS: 0
PARESTHESIAS: 0

## 2022-11-23 ENCOUNTER — OFFICE VISIT (OUTPATIENT)
Dept: FAMILY MEDICINE | Facility: CLINIC | Age: 27
End: 2022-11-23
Payer: COMMERCIAL

## 2022-11-23 VITALS
OXYGEN SATURATION: 98 % | HEART RATE: 87 BPM | DIASTOLIC BLOOD PRESSURE: 85 MMHG | TEMPERATURE: 97.4 F | SYSTOLIC BLOOD PRESSURE: 139 MMHG | WEIGHT: 275 LBS | BODY MASS INDEX: 35.29 KG/M2 | RESPIRATION RATE: 20 BRPM | HEIGHT: 74 IN

## 2022-11-23 DIAGNOSIS — Z00.00 ANNUAL PHYSICAL EXAM: ICD-10-CM

## 2022-11-23 DIAGNOSIS — Z13.6 SCREENING FOR CARDIOVASCULAR CONDITION: ICD-10-CM

## 2022-11-23 DIAGNOSIS — Z11.59 NEED FOR HEPATITIS C SCREENING TEST: Primary | ICD-10-CM

## 2022-11-23 LAB
ANION GAP SERPL CALCULATED.3IONS-SCNC: 11 MMOL/L (ref 7–15)
BUN SERPL-MCNC: 15.4 MG/DL (ref 6–20)
CALCIUM SERPL-MCNC: 9.8 MG/DL (ref 8.6–10)
CHLORIDE SERPL-SCNC: 102 MMOL/L (ref 98–107)
CHOLEST SERPL-MCNC: 196 MG/DL
CREAT SERPL-MCNC: 0.91 MG/DL (ref 0.67–1.17)
DEPRECATED HCO3 PLAS-SCNC: 24 MMOL/L (ref 22–29)
GFR SERPL CREATININE-BSD FRML MDRD: >90 ML/MIN/1.73M2
GLUCOSE SERPL-MCNC: 104 MG/DL (ref 70–99)
HCV AB SERPL QL IA: NONREACTIVE
HDLC SERPL-MCNC: 34 MG/DL
LDLC SERPL CALC-MCNC: 139 MG/DL
NONHDLC SERPL-MCNC: 162 MG/DL
POTASSIUM SERPL-SCNC: 4.6 MMOL/L (ref 3.4–5.3)
SODIUM SERPL-SCNC: 137 MMOL/L (ref 136–145)
TRIGL SERPL-MCNC: 117 MG/DL

## 2022-11-23 PROCEDURE — 80061 LIPID PANEL: CPT | Performed by: PHYSICIAN ASSISTANT

## 2022-11-23 PROCEDURE — 80048 BASIC METABOLIC PNL TOTAL CA: CPT | Performed by: PHYSICIAN ASSISTANT

## 2022-11-23 PROCEDURE — 86803 HEPATITIS C AB TEST: CPT | Performed by: PHYSICIAN ASSISTANT

## 2022-11-23 PROCEDURE — 36415 COLL VENOUS BLD VENIPUNCTURE: CPT | Performed by: PHYSICIAN ASSISTANT

## 2022-11-23 PROCEDURE — 99395 PREV VISIT EST AGE 18-39: CPT | Performed by: PHYSICIAN ASSISTANT

## 2022-11-23 ASSESSMENT — ENCOUNTER SYMPTOMS
HEMATOCHEZIA: 0
HEMATURIA: 0
JOINT SWELLING: 0
DYSURIA: 0
HEADACHES: 0
CONSTIPATION: 0
COUGH: 0
ABDOMINAL PAIN: 0
PARESTHESIAS: 0
CHILLS: 0
DIARRHEA: 0
MYALGIAS: 0
DIZZINESS: 0
SHORTNESS OF BREATH: 0
SORE THROAT: 0
FREQUENCY: 0
NERVOUS/ANXIOUS: 0
ARTHRALGIAS: 0
PALPITATIONS: 0
HEARTBURN: 0
FEVER: 0
EYE PAIN: 0
NAUSEA: 0
WEAKNESS: 0

## 2022-11-23 NOTE — PROGRESS NOTES
SUBJECTIVE:   CC: Ketan is an 27 year old who presents for preventative health visit.   Patient has been advised of split billing requirements and indicates understanding: Yes  27-year-old presents for annual exam he has no concerns  He is getting  in the spring and they are building a home in [place].  He will be with family tomorrow     Healthy Habits:     Getting at least 3 servings of Calcium per day:  Yes    Bi-annual eye exam:  Yes    Dental care twice a year:  NO    Sleep apnea or symptoms of sleep apnea:  None    Diet:  Regular (no restrictions)    Frequency of exercise:  2-3 days/week    Duration of exercise:  30-45 minutes    Taking medications regularly:  Yes    Medication side effects:  None    PHQ-2 Total Score: 0    Additional concerns today:  No        Today's PHQ-2 Score:   PHQ-2 ( 1999 [employer]) 11/16/2022   Q1: Little interest or pleasure in doing things 0   Q2: Feeling down, depressed or hopeless 0   PHQ-2 Score 0   Q1: Little interest or pleasure in doing things Not at all   Q2: Feeling down, depressed or hopeless Not at all   PHQ-2 Score 0       Have you ever done Advance Care Planning? (For example, a Health Directive, POLST, or a discussion with a medical provider or your loved ones about your wishes): No, advance care planning information given to patient to review.  Patient declined advance care planning discussion at this time.    Social History     Tobacco Use     Smoking status: Never     Smokeless tobacco: Never   Substance Use Topics     Alcohol use: Never         Alcohol Use 11/16/2022   Prescreen: >3 drinks/day or >7 drinks/week? No       Last PSA: No results found for: PSA    Reviewed orders with patient. Reviewed health maintenance and updated orders accordingly - Yes  Lab work is in process    Reviewed and updated as needed this visit by clinical staff   Tobacco  Allergies  Meds              Reviewed and updated as needed this visit by Provider                      Review of Systems   Constitutional: Negative for chills and fever.   HENT: Negative for congestion, ear pain, hearing loss and sore throat.    Eyes: Negative for pain and visual disturbance.   Respiratory: Negative for cough and shortness of breath.    Cardiovascular: Negative for chest pain, palpitations and peripheral edema.   Gastrointestinal: Negative for abdominal pain, constipation, diarrhea, heartburn, hematochezia and nausea.   Genitourinary: Negative for dysuria, frequency, genital sores, hematuria, impotence, penile discharge and urgency.   Musculoskeletal: Negative for arthralgias, joint swelling and myalgias.   Skin: Negative for rash.   Neurological: Negative for dizziness, weakness, headaches and paresthesias.   Psychiatric/Behavioral: Negative for mood changes. The patient is not nervous/anxious.          OBJECTIVE:   There were no vitals taken for this visit.    Physical Exam  Vitals and nursing note reviewed.   Constitutional:       Appearance: Normal appearance.   HENT:      Head: Normocephalic and atraumatic.      Right Ear: Tympanic membrane normal.      Left Ear: Tympanic membrane normal.      Nose: Nose normal. No congestion or rhinorrhea.      Mouth/Throat:      Mouth: Mucous membranes are moist.   Eyes:      Conjunctiva/sclera: Conjunctivae normal.   Cardiovascular:      Rate and Rhythm: Normal rate and regular rhythm.      Heart sounds: Normal heart sounds.   Pulmonary:      Effort: Pulmonary effort is normal.      Breath sounds: Normal breath sounds.   Abdominal:      General: Abdomen is flat. Bowel sounds are normal.      Palpations: There is no mass.      Tenderness: There is no abdominal tenderness.   Musculoskeletal:         General: Normal range of motion.      Cervical back: Normal range of motion and neck supple.      Right lower leg: No edema.      Left lower leg: No edema.   Lymphadenopathy:      Cervical: No cervical adenopathy.   Skin:     General: Skin is warm and dry.  "  Neurological:      General: No focal deficit present.   Psychiatric:         Mood and Affect: Mood normal.         Behavior: Behavior normal.         Thought Content: Thought content normal.         Judgment: Judgment normal.               ASSESSMENT/PLAN:   (Z11.59) Need for hepatitis C screening test  (primary encounter diagnosis)  Comment: Consents to screen will screen today  Plan: Hepatitis C Screen Reflex to HCV RNA Quant and         Genotype            (Z13.6) Screening for cardiovascular condition  Comment: Labs today  Plan: Lipid panel reflex to direct LDL Fasting, Basic        metabolic panel  (Ca, Cl, CO2, Creat, Gluc, K,         Na, BUN)            (Z00.00) Annual physical exam  Comment: Return in 1 year and as neededPlan:           COUNSELING:         BMI:   Estimated body mass index is 35.4 kg/m  as calculated from the following:    Height as of 6/22/22: 1.867 m (6' 1.5\").    Weight as of 6/22/22: 123.4 kg (272 lb).         He reports that he has never smoked. He has never used smokeless tobacco.        PEPITO Soto  [facility]  "

## 2023-12-11 ASSESSMENT — ENCOUNTER SYMPTOMS
NERVOUS/ANXIOUS: 0
PARESTHESIAS: 0
CHILLS: 0
DYSURIA: 0
FEVER: 0
JOINT SWELLING: 0
HEMATOCHEZIA: 0
DIZZINESS: 0
EYE PAIN: 0
WEAKNESS: 0
DIARRHEA: 0
NAUSEA: 0
SHORTNESS OF BREATH: 0
ARTHRALGIAS: 0
ABDOMINAL PAIN: 0
HEMATURIA: 0
PALPITATIONS: 0
SORE THROAT: 0
CONSTIPATION: 0
COUGH: 0
MYALGIAS: 0
HEARTBURN: 0
FREQUENCY: 0
HEADACHES: 0

## 2023-12-15 ENCOUNTER — OFFICE VISIT (OUTPATIENT)
Dept: FAMILY MEDICINE | Facility: CLINIC | Age: 28
End: 2023-12-15
Payer: COMMERCIAL

## 2023-12-15 VITALS
RESPIRATION RATE: 20 BRPM | DIASTOLIC BLOOD PRESSURE: 86 MMHG | HEART RATE: 72 BPM | HEIGHT: 75 IN | TEMPERATURE: 96.4 F | WEIGHT: 283 LBS | BODY MASS INDEX: 35.19 KG/M2 | OXYGEN SATURATION: 98 % | SYSTOLIC BLOOD PRESSURE: 136 MMHG

## 2023-12-15 DIAGNOSIS — Z00.00 ANNUAL PHYSICAL EXAM: Primary | ICD-10-CM

## 2023-12-15 DIAGNOSIS — Z13.6 SCREENING FOR CARDIOVASCULAR CONDITION: ICD-10-CM

## 2023-12-15 LAB
CHOLEST SERPL-MCNC: 211 MG/DL
FASTING STATUS PATIENT QL REPORTED: ABNORMAL
FASTING STATUS PATIENT QL REPORTED: ABNORMAL
GLUCOSE SERPL-MCNC: 110 MG/DL (ref 70–99)
HDLC SERPL-MCNC: 40 MG/DL
LDLC SERPL CALC-MCNC: 147 MG/DL
NONHDLC SERPL-MCNC: 171 MG/DL
TRIGL SERPL-MCNC: 120 MG/DL

## 2023-12-15 PROCEDURE — 80061 LIPID PANEL: CPT | Performed by: PHYSICIAN ASSISTANT

## 2023-12-15 PROCEDURE — 99395 PREV VISIT EST AGE 18-39: CPT | Performed by: PHYSICIAN ASSISTANT

## 2023-12-15 PROCEDURE — 36415 COLL VENOUS BLD VENIPUNCTURE: CPT | Performed by: PHYSICIAN ASSISTANT

## 2023-12-15 PROCEDURE — 82947 ASSAY GLUCOSE BLOOD QUANT: CPT | Mod: QW | Performed by: PHYSICIAN ASSISTANT

## 2023-12-15 ASSESSMENT — ENCOUNTER SYMPTOMS
HEMATOCHEZIA: 0
JOINT SWELLING: 0
HEMATURIA: 0
DIZZINESS: 0
CONSTIPATION: 0
HEARTBURN: 0
PARESTHESIAS: 0
COUGH: 0
FEVER: 0
PALPITATIONS: 0
FREQUENCY: 0
MYALGIAS: 0
ARTHRALGIAS: 0
NERVOUS/ANXIOUS: 0
DIARRHEA: 0
WEAKNESS: 0
SORE THROAT: 0
EYE PAIN: 0
SHORTNESS OF BREATH: 0
ABDOMINAL PAIN: 0
NAUSEA: 0
DYSURIA: 0
HEADACHES: 0
CHILLS: 0

## 2023-12-15 NOTE — PROGRESS NOTES
"SUBJECTIVE:   Gavino is a 28 year old, presenting for the following:  Physical (Pt here for Px and is fasting today)        12/15/2023     8:18 AM   Additional Questions   Roomed by Jakob BLACK       Presents to clinic for annual exam no complaints or concerns  No routine medications  He is due for some blood work    Healthy Habits:     Getting at least 3 servings of Calcium per day:  Yes    Bi-annual eye exam:  Yes    Dental care twice a year:  NO    Sleep apnea or symptoms of sleep apnea:  None    Diet:  Regular (no restrictions)    Frequency of exercise:  2-3 days/week    Duration of exercise:  30-45 minutes    Taking medications regularly:  Yes    Barriers to taking medications:  None    Medication side effects:  None    Additional concerns today:  No      Today's PHQ-2 Score:       12/15/2023     8:08 AM   PHQ-2 ( 1999 [employer])   Q1: Little interest or pleasure in doing things 0   Q2: Feeling down, depressed or hopeless 0   PHQ-2 Score 0   Q1: Little interest or pleasure in doing things Not at all   Q2: Feeling down, depressed or hopeless Not at all   PHQ-2 Score 0                       Social History     Tobacco Use    Smoking status: Never    Smokeless tobacco: Never   Substance Use Topics    Alcohol use: Never             12/11/2023    10:58 AM   Alcohol Use   Prescreen: >3 drinks/day or >7 drinks/week? No       Last PSA: No results found for: \"PSA\"    Reviewed orders with patient. Reviewed health maintenance and updated orders accordingly -   Lab work is in process  Labs reviewed in EPIC    Reviewed and updated as needed this visit by clinical staff   Tobacco  Allergies  Meds              Reviewed and updated as needed this visit by Provider                     Review of Systems   Constitutional:  Negative for chills and fever.   HENT:  Negative for congestion, ear pain, hearing loss and sore throat.    Eyes:  Negative for pain and visual disturbance.   Respiratory:  Negative for cough and shortness of breath. " "   Cardiovascular:  Negative for chest pain, palpitations and peripheral edema.   Gastrointestinal:  Negative for abdominal pain, constipation, diarrhea, heartburn, hematochezia and nausea.   Genitourinary:  Negative for dysuria, frequency, genital sores, hematuria, impotence, penile discharge and urgency.   Musculoskeletal:  Negative for arthralgias, joint swelling and myalgias.   Skin:  Negative for rash.   Neurological:  Negative for dizziness, weakness, headaches and paresthesias.   Psychiatric/Behavioral:  Negative for mood changes. The patient is not nervous/anxious.          OBJECTIVE:   BP (!) 146/94 (BP Location: Right arm, Patient Position: Sitting, Cuff Size: Adult Large)   Pulse 72   Temp (!) 96.4  F (35.8  C) (Tympanic)   Resp 20   Ht 1.892 m (6' 2.5\")   Wt 128.4 kg (283 lb)   SpO2 98%   BMI 35.85 kg/m      Physical Exam  Vitals and nursing note reviewed.   Constitutional:       Appearance: Normal appearance.   HENT:      Head: Normocephalic and atraumatic.      Right Ear: Tympanic membrane normal.      Left Ear: Tympanic membrane normal.      Nose: Nose normal. No congestion or rhinorrhea.      Mouth/Throat:      Mouth: Mucous membranes are moist.   Eyes:      Conjunctiva/sclera: Conjunctivae normal.   Cardiovascular:      Rate and Rhythm: Normal rate and regular rhythm.      Heart sounds: Normal heart sounds.   Pulmonary:      Effort: Pulmonary effort is normal.      Breath sounds: Normal breath sounds.   Abdominal:      General: Abdomen is flat. Bowel sounds are normal.      Palpations: There is no mass.      Tenderness: There is no abdominal tenderness.   Musculoskeletal:         General: Normal range of motion.      Cervical back: Normal range of motion and neck supple.      Right lower leg: No edema.      Left lower leg: No edema.   Lymphadenopathy:      Cervical: No cervical adenopathy.   Skin:     General: Skin is warm and dry.   Neurological:      General: No focal deficit present. " "  Psychiatric:         Mood and Affect: Mood normal.         Behavior: Behavior normal.         Thought Content: Thought content normal.         Judgment: Judgment normal.               ASSESSMENT/PLAN:   (Z00.00) Annual physical exam  (primary encounter diagnosis)  Comment: Return in 1 year and as needed  Plan:     (Z13.6) Screening for cardiovascular condition  Comment: Labs  Plan: Lipid panel reflex to direct LDL Fasting,         Glucose                  COUNSELING:   Reviewed preventive health counseling, as reflected in patient instructions       Regular exercise       Healthy diet/nutrition      BMI:   Estimated body mass index is 35.85 kg/m  as calculated from the following:    Height as of this encounter: 1.892 m (6' 2.5\").    Weight as of this encounter: 128.4 kg (283 lb).         He reports that he has never smoked. He has never used smokeless tobacco.            PEPITO Soto  [facility]  " PACU

## 2024-12-22 DIAGNOSIS — J01.01 ACUTE RECURRENT MAXILLARY SINUSITIS: Primary | ICD-10-CM

## 2024-12-22 RX ORDER — AZITHROMYCIN 250 MG/1
TABLET, FILM COATED ORAL
Qty: 6 TABLET | Refills: 0 | Status: SHIPPED | OUTPATIENT
Start: 2024-12-22 | End: 2024-12-27